# Patient Record
Sex: MALE | Race: WHITE | Employment: UNEMPLOYED | URBAN - NONMETROPOLITAN AREA
[De-identification: names, ages, dates, MRNs, and addresses within clinical notes are randomized per-mention and may not be internally consistent; named-entity substitution may affect disease eponyms.]

---
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
## 2024-03-05 ENCOUNTER — TELEPHONE (OUTPATIENT)
Age: 19
End: 2024-03-05

## 2024-03-05 NOTE — TELEPHONE ENCOUNTER
Pt's grandma called stating patient's genital herpes is flaring up and she would like a refill of pt's Acyclovir 400mg tablets. Grandma states patient was diagnosed with this at [facility] in July of 2022, but she is unable to call them for the refill. Please advise.

## 2024-03-08 ENCOUNTER — OFFICE VISIT (OUTPATIENT)
Age: 19
End: 2024-03-08
Payer: COMMERCIAL

## 2024-03-08 VITALS
SYSTOLIC BLOOD PRESSURE: 120 MMHG | WEIGHT: 154.4 LBS | RESPIRATION RATE: 16 BRPM | TEMPERATURE: 98.1 F | BODY MASS INDEX: 20.37 KG/M2 | DIASTOLIC BLOOD PRESSURE: 70 MMHG | HEART RATE: 60 BPM

## 2024-03-08 DIAGNOSIS — Z11.3 SCREENING FOR STD (SEXUALLY TRANSMITTED DISEASE): ICD-10-CM

## 2024-03-08 DIAGNOSIS — B00.9 HSV (HERPES SIMPLEX VIRUS) INFECTION: Primary | ICD-10-CM

## 2024-03-08 PROCEDURE — 99213 OFFICE O/P EST LOW 20 MIN: CPT | Performed by: PEDIATRICS

## 2024-03-08 RX ORDER — ACYCLOVIR 800 MG/1
800 TABLET ORAL 2 TIMES DAILY
Qty: 14 TABLET | Refills: 3 | Status: SHIPPED | OUTPATIENT
Start: 2024-03-08 | End: 2024-03-13

## 2024-03-08 ASSESSMENT — PATIENT HEALTH QUESTIONNAIRE - PHQ9
1. LITTLE INTEREST OR PLEASURE IN DOING THINGS: 0
SUM OF ALL RESPONSES TO PHQ QUESTIONS 1-9: 0
2. FEELING DOWN, DEPRESSED OR HOPELESS: 0
SUM OF ALL RESPONSES TO PHQ QUESTIONS 1-9: 0
SUM OF ALL RESPONSES TO PHQ QUESTIONS 1-9: 0
SUM OF ALL RESPONSES TO PHQ9 QUESTIONS 1 & 2: 0
SUM OF ALL RESPONSES TO PHQ QUESTIONS 1-9: 0

## 2024-03-08 ASSESSMENT — ENCOUNTER SYMPTOMS
RESPIRATORY NEGATIVE: 1
GASTROINTESTINAL NEGATIVE: 1

## 2024-03-08 NOTE — PROGRESS NOTES
ASSESSMENT:         1. HSV (herpes simplex virus) infection    2. Screening for STD (sexually transmitted disease)    Recurrent genital HSV infection, With no evidence of complication at this time     PLAN:     Defers Hep C, HIV screening today   Antiviral course sent to pharmacy   Follow up urine GC  Discussed safe sex practices   Discussed reasons for re-evaluation -consider furthera lab work, viral studies, imaging, ED/specialty referral as indicated if no improvement     Arcenio was seen today for other.    Diagnoses and all orders for this visit:    HSV (herpes simplex virus) infection    Screening for STD (sexually transmitted disease)  -     C.trachomatis N.gonorrhoeae DNA, Urine ([place] Only); Future  -     C.trachomatis N.gonorrhoeae DNA, Urine ([place] Only)    Other orders  -     acyclovir (ZOVIRAX) 800 MG tablet; Take 1 tablet by mouth 2 times daily for 5 days          No follow-ups on file.    SUBJECTIVE:      Chief Complaint   Patient presents with    Other     HSV type 2 outbreak       HPI: Arcenio Kerns is a 18 y.o. male here today because of concerns for genital herpetic infection that started this week. No concerns for testicular pain, discharge, dysuria. Afebrile.     No concerns for other STIs. Has had one partner in the past two years     PMH of genital HSV 1, last outbreak two years ago     /70 (Site: Right Upper Arm, Position: Sitting, Cuff Size: Medium Adult)   Pulse 60   Temp 98.1 °F (36.7 °C) (Temporal)   Resp 16   Wt 70 kg (154 lb 6.4 oz)   BMI 20.37 kg/m²     Allergies   Allergen Reactions    Rocephin [Ceftriaxone Sodium] Hives       Current Outpatient Medications on File Prior to Visit   Medication Sig Dispense Refill    fluticasone (FLONASE) 50 MCG/ACT nasal spray 2 sprays by Each Nostril route daily (Patient not taking: Reported on 2/27/2023) 16 g 0    ondansetron (ZOFRAN) 4 MG tablet Take 1 tablet by mouth daily as needed for Nausea or Vomiting (Patient not taking:

## 2024-03-12 LAB
C TRACH DNA UR QL NAA+PROBE: NEGATIVE
N GONORRHOEA DNA UR QL NAA+PROBE: NEGATIVE

## 2024-07-31 ENCOUNTER — TELEPHONE (OUTPATIENT)
Age: 19
End: 2024-07-31

## 2024-07-31 NOTE — TELEPHONE ENCOUNTER
Patient called stating that he is having a herpes outbreak and needs his acyclovir refilled. This nurse advised that there were 3 refills on this and to call [facility] to have one of those refills filled. Patient voiced understanding. No further action required.